# Patient Record
Sex: FEMALE | Race: WHITE | NOT HISPANIC OR LATINO | Employment: OTHER | URBAN - METROPOLITAN AREA
[De-identification: names, ages, dates, MRNs, and addresses within clinical notes are randomized per-mention and may not be internally consistent; named-entity substitution may affect disease eponyms.]

---

## 2017-01-01 ENCOUNTER — APPOINTMENT (OUTPATIENT)
Dept: GENERAL RADIOLOGY | Facility: HOSPITAL | Age: 82
End: 2017-01-01

## 2017-01-01 ENCOUNTER — HOSPITAL ENCOUNTER (EMERGENCY)
Facility: HOSPITAL | Age: 82
Discharge: HOME | End: 2017-01-01
Attending: EMERGENCY MEDICINE | Admitting: EMERGENCY MEDICINE

## 2017-01-01 VITALS
BODY MASS INDEX: 22.38 KG/M2 | SYSTOLIC BLOOD PRESSURE: 165 MMHG | DIASTOLIC BLOOD PRESSURE: 96 MMHG | HEIGHT: 60 IN | RESPIRATION RATE: 16 BRPM | WEIGHT: 114 LBS | OXYGEN SATURATION: 100 % | TEMPERATURE: 98.1 F | HEART RATE: 83 BPM

## 2017-01-01 DIAGNOSIS — J20.9 ACUTE BRONCHITIS, UNSPECIFIED ORGANISM: Primary | ICD-10-CM

## 2017-01-01 DIAGNOSIS — I50.9 CONGESTIVE HEART FAILURE, UNSPECIFIED CONGESTIVE HEART FAILURE CHRONICITY, UNSPECIFIED CONGESTIVE HEART FAILURE TYPE: ICD-10-CM

## 2017-01-01 DIAGNOSIS — J98.01 BRONCHOSPASM: ICD-10-CM

## 2017-01-01 LAB
ALBUMIN SERPL-MCNC: 3.5 G/DL (ref 3.5–5.2)
ALBUMIN/GLOB SERPL: 1.1 G/DL
ALP SERPL-CCNC: 57 U/L (ref 40–129)
ALT SERPL W P-5'-P-CCNC: 6 U/L (ref 5–33)
ANION GAP SERPL CALCULATED.3IONS-SCNC: 16 MMOL/L
AST SERPL-CCNC: 16 U/L (ref 5–32)
BASOPHILS # BLD AUTO: 0.02 10*3/MM3 (ref 0–0.2)
BASOPHILS NFR BLD AUTO: 0.5 % (ref 0–2)
BILIRUB SERPL-MCNC: 0.2 MG/DL (ref 0.2–1.2)
BUN BLD-MCNC: 16 MG/DL (ref 8–23)
BUN/CREAT SERPL: 16 (ref 7–25)
CALCIUM SPEC-SCNC: 9.3 MG/DL (ref 8.2–9.6)
CHLORIDE SERPL-SCNC: 85 MMOL/L (ref 98–107)
CO2 SERPL-SCNC: 21 MMOL/L (ref 22–29)
CREAT BLD-MCNC: 1 MG/DL (ref 0.57–1)
DEPRECATED RDW RBC AUTO: 42.8 FL (ref 37–54)
EOSINOPHIL # BLD AUTO: 0.12 10*3/MM3 (ref 0.1–0.3)
EOSINOPHIL NFR BLD AUTO: 3.2 % (ref 0–4)
ERYTHROCYTE [DISTWIDTH] IN BLOOD BY AUTOMATED COUNT: 13.6 % (ref 11.5–14.5)
FLUAV AG NPH QL: NEGATIVE
FLUBV AG NPH QL IA: NEGATIVE
GFR SERPL CREATININE-BSD FRML MDRD: 52 ML/MIN/1.73
GLOBULIN UR ELPH-MCNC: 3.2 GM/DL
GLUCOSE BLD-MCNC: 102 MG/DL (ref 65–99)
HCT VFR BLD AUTO: 31.5 % (ref 37–47)
HGB BLD-MCNC: 10.2 G/DL (ref 12–16)
IMM GRANULOCYTES # BLD: 0.02 10*3/MM3 (ref 0–0.03)
IMM GRANULOCYTES NFR BLD: 0.5 % (ref 0–0.5)
LYMPHOCYTES # BLD AUTO: 0.74 10*3/MM3 (ref 0.6–4.8)
LYMPHOCYTES NFR BLD AUTO: 19.8 % (ref 20–45)
MCH RBC QN AUTO: 28 PG (ref 27–31)
MCHC RBC AUTO-ENTMCNC: 32.4 G/DL (ref 31–37)
MCV RBC AUTO: 86.5 FL (ref 81–99)
MONOCYTES # BLD AUTO: 0.5 10*3/MM3 (ref 0–1)
MONOCYTES NFR BLD AUTO: 13.4 % (ref 3–8)
NEUTROPHILS # BLD AUTO: 2.34 10*3/MM3 (ref 1.5–8.3)
NEUTROPHILS NFR BLD AUTO: 62.6 % (ref 45–70)
NRBC BLD MANUAL-RTO: 0 /100 WBC (ref 0–0)
NT-PROBNP SERPL-MCNC: 2141 PG/ML (ref 5–450)
PLATELET # BLD AUTO: 188 10*3/MM3 (ref 140–500)
PMV BLD AUTO: 10.5 FL (ref 7.4–10.4)
POTASSIUM BLD-SCNC: 3.9 MMOL/L (ref 3.5–5.2)
PROT SERPL-MCNC: 6.7 G/DL (ref 6–8.5)
RBC # BLD AUTO: 3.64 10*6/MM3 (ref 4.2–5.4)
SODIUM BLD-SCNC: 122 MMOL/L (ref 136–145)
TROPONIN T SERPL-MCNC: <0.01 NG/ML (ref 0–0.03)
WBC NRBC COR # BLD: 3.74 10*3/MM3 (ref 4.8–10.8)

## 2017-01-01 PROCEDURE — 84484 ASSAY OF TROPONIN QUANT: CPT | Performed by: EMERGENCY MEDICINE

## 2017-01-01 PROCEDURE — 99284 EMERGENCY DEPT VISIT MOD MDM: CPT | Performed by: EMERGENCY MEDICINE

## 2017-01-01 PROCEDURE — 99283 EMERGENCY DEPT VISIT LOW MDM: CPT

## 2017-01-01 PROCEDURE — 71020 HC CHEST PA AND LATERAL: CPT

## 2017-01-01 PROCEDURE — 80053 COMPREHEN METABOLIC PANEL: CPT | Performed by: EMERGENCY MEDICINE

## 2017-01-01 PROCEDURE — 94640 AIRWAY INHALATION TREATMENT: CPT

## 2017-01-01 PROCEDURE — 87804 INFLUENZA ASSAY W/OPTIC: CPT | Performed by: EMERGENCY MEDICINE

## 2017-01-01 PROCEDURE — 85025 COMPLETE CBC W/AUTO DIFF WBC: CPT | Performed by: EMERGENCY MEDICINE

## 2017-01-01 PROCEDURE — 93010 ELECTROCARDIOGRAM REPORT: CPT | Performed by: INTERNAL MEDICINE

## 2017-01-01 PROCEDURE — 83880 ASSAY OF NATRIURETIC PEPTIDE: CPT | Performed by: EMERGENCY MEDICINE

## 2017-01-01 PROCEDURE — 93005 ELECTROCARDIOGRAM TRACING: CPT | Performed by: EMERGENCY MEDICINE

## 2017-01-01 RX ORDER — IPRATROPIUM BROMIDE AND ALBUTEROL SULFATE 2.5; .5 MG/3ML; MG/3ML
3 SOLUTION RESPIRATORY (INHALATION) ONCE
Status: COMPLETED | OUTPATIENT
Start: 2017-01-01 | End: 2017-01-01

## 2017-01-01 RX ORDER — IPRATROPIUM BROMIDE AND ALBUTEROL SULFATE 2.5; .5 MG/3ML; MG/3ML
SOLUTION RESPIRATORY (INHALATION)
Status: DISCONTINUED
Start: 2017-01-01 | End: 2017-01-01 | Stop reason: HOSPADM

## 2017-01-01 RX ORDER — ALBUTEROL SULFATE 90 UG/1
2 AEROSOL, METERED RESPIRATORY (INHALATION)
Qty: 8 G | Refills: 0 | Status: SHIPPED | OUTPATIENT
Start: 2017-01-01

## 2017-01-01 RX ORDER — ONDANSETRON 8 MG/1
8 TABLET, ORALLY DISINTEGRATING ORAL EVERY 8 HOURS PRN
Qty: 10 TABLET | Refills: 0 | Status: SHIPPED | OUTPATIENT
Start: 2017-01-01

## 2017-01-01 RX ADMIN — IPRATROPIUM BROMIDE AND ALBUTEROL SULFATE 3 ML: .5; 3 SOLUTION RESPIRATORY (INHALATION) at 14:14

## 2017-01-01 RX ADMIN — IPRATROPIUM BROMIDE AND ALBUTEROL SULFATE 3 ML: .5; 3 SOLUTION RESPIRATORY (INHALATION) at 13:28

## 2017-01-01 RX ADMIN — IPRATROPIUM BROMIDE AND ALBUTEROL SULFATE 3 ML: .5; 3 SOLUTION RESPIRATORY (INHALATION) at 12:19

## 2017-01-01 NOTE — DISCHARGE INSTRUCTIONS
Take your Lasix 20 mg by mouth daily today and tomorrow.  Call Dr. Alvarenga for a follow-up on Tuesday.  Call your cardiologist for a follow-up in 1 week.  Return if you have increasing shortness of breath, vomiting, worse in any way at all.

## 2017-01-01 NOTE — ED PROVIDER NOTES
Subjective   History of Present Illness  History of Present Illness    Chief complaint: Cough    Location: Home    Quality/Severity:  Moderate with vomiting    Timing/Onset/Duration: Gradual onset over the last 2 days    Modifying Factors: Coughing seems to make it worse, remaining still makes it better    Associated Symptoms: No headache.  The patient has had a subjective fever.  The patient has had a cough productive of a little sputum, the patient is unsure the color.  There is been no sore throat earache or nasal congestion.  There is no chest pain.  There is no shortness of breath.  There is no abdominal pain, diarrhea, or burning when she urinates.  Patient has had chills.    Narrative: This 89-year-old white female presents with a cough with vomiting secondary to proximal systems of cough.  This is been going on for 2 days.  The patient was seen at an [facility] center on [facility] on Thursday and prescribe doxycycline.  X-rays according to the family showed questionable scar tissue and the patient was placed on the doxycycline to cover her for possible pneumonia.  The patient has had chills.  And subjective fever.  The cough is been slightly productive.  The patient has never been a smoker.  Patient has no history of COPD or asthma.  The patient does have a history of hypertension and states that her blood pressure goes up when she is and the doctor's office.    PCP:  Jada Alvarenga      Review of Systems   Constitutional: Positive for chills and fever (subjective).   HENT: Negative for congestion, ear pain and sore throat.    Eyes: Negative for pain and discharge.   Respiratory: Positive for cough. Negative for chest tightness, shortness of breath and wheezing.    Cardiovascular: Negative for chest pain, palpitations and leg swelling.   Gastrointestinal: Positive for vomiting (secondary to  cough). Negative for abdominal pain, blood in stool, constipation, diarrhea and nausea.   Genitourinary: Negative  for dysuria, flank pain and frequency.   Musculoskeletal: Negative for back pain.   Skin: Negative for rash.   Neurological: Negative for weakness and headaches.   Hematological: Negative for adenopathy.   Psychiatric/Behavioral: Negative for confusion.        Medication List      ASK your doctor about these medications          amLODIPine 5 MG tablet   Commonly known as:  NORVASC       aspirin 81 MG EC tablet       doxycycline 100 MG capsule   Commonly known as:  MONODOX   Take 1 capsule by mouth 2 (Two) Times a Day.       furosemide 20 MG tablet   Commonly known as:  LASIX       losartan 50 MG tablet   Commonly known as:  COZAAR       metoprolol succinate XL 25 MG 24 hr tablet   Commonly known as:  TOPROL-XL       NAMENDA 5 MG tablet   Generic drug:  memantine       omeprazole 40 MG capsule   Commonly known as:  priLOSEC       PHENERGAN PO       SYNTHROID 50 MCG tablet   Generic drug:  levothyroxine       ZETIA 10 MG tablet   Generic drug:  ezetimibe           Past Medical History   Diagnosis Date   • Coronary artery disease    • Dementia    • Disease of thyroid gland    • GERD (gastroesophageal reflux disease)    • Hyperlipidemia    • Hypertension        Allergies   Allergen Reactions   • Motrin [Ibuprofen]    • Penicillins    • Zithromax [Azithromycin] Swelling       Past Surgical History   Procedure Laterality Date   • Cardiac surgery     • Hysterectomy     • Cholecystectomy         History reviewed. No pertinent family history.    Social History     Social History   • Marital status:      Spouse name: N/A   • Number of children: N/A   • Years of education: N/A     Social History Main Topics   • Smoking status: Former Smoker   • Smokeless tobacco: None   • Alcohol use No   • Drug use: Defer   • Sexual activity: Defer     Other Topics Concern   • None     Social History Narrative   • None           Objective   Physical Exam   Constitutional: She is oriented to person, place, and time. She appears  well-developed and well-nourished. No distress.   ED Triage Vitals:  Temp: 98.1 °F (36.7 °C) (01/01/17 1127)  Heart Rate: 79 (01/01/17 1127)  Resp: 16 (01/01/17 1127)  BP: 165/96 (01/01/17 1127)  SpO2: 96 % (01/01/17 1127)  Temp src: Oral (01/01/17 1127)  Heart Rate Source: Monitor (01/01/17 1127)  Patient Position: Sitting (01/01/17 1127)  BP Location: Right arm (01/01/17 1127)  FiO2 (%): n/a    The patient's vitals were reviewed by me.  Unless otherwise noted they are within normal limits.  The patient is hypertensive with a blood pressure 165/96.     HENT:   Head: Normocephalic and atraumatic.   Right Ear: External ear normal.   Left Ear: External ear normal.   Nose: Nose normal.   Mouth/Throat: Oropharynx is clear and moist.   Eyes: Conjunctivae and EOM are normal. Pupils are equal, round, and reactive to light. Right eye exhibits no discharge. Left eye exhibits no discharge. No scleral icterus.   Neck: Normal range of motion. Neck supple. No JVD present. No tracheal deviation present. No thyromegaly present.   Cardiovascular: Normal rate, regular rhythm, normal heart sounds and intact distal pulses.  Exam reveals no gallop and no friction rub.    No murmur heard.  Pulmonary/Chest: Effort normal. No stridor. No respiratory distress. She has wheezes (the patient has expiratory wheezes on the left upper and lower lung fields). She has no rales. She exhibits no tenderness.   Abdominal: Soft. Bowel sounds are normal. She exhibits no distension and no mass. There is no tenderness. There is no rebound and no guarding. No hernia.   Musculoskeletal: Normal range of motion. She exhibits no edema or deformity.   Lymphadenopathy:     She has no cervical adenopathy.   Neurological: She is alert and oriented to person, place, and time.   Skin: Skin is warm and dry. No rash noted. She is not diaphoretic. No erythema. No pallor.   Psychiatric: Her behavior is normal.   Nursing note and vitals reviewed.      Procedures          ED Course  ED Course   Comment By Time   The laboratory values were reviewed by me.  The BNP is 2141.  The white blood cell count is 3.7.  The hemoglobin is 10.  The hematocrit is 31.5.  The glucose is 102.  The sodium is 1.2.  The chloride is 85.  The CO2 is 21.  The laboratory values are otherwise unremarkable. Vinay Joe MD 01/01 1323                  MDM  XR Chest 2 View   ED Interpretation   The chest x-ray was contemporaneously reviewed by me.  There is   cardiomegaly and mild pulmonary vascular congestion.        Labs Reviewed   COMPREHENSIVE METABOLIC PANEL - Abnormal; Notable for the following:        Result Value    Glucose 102 (*)     Sodium 122 (*)     Chloride 85 (*)     CO2 21.0 (*)     eGFR Non  Amer 52 (*)     All other components within normal limits    Narrative:     The MDRD GFR formula is only valid for adults with stable renal function between ages 18 and 70.   BNP (IN-HOUSE) - Abnormal; Notable for the following:     proBNP 2141.0 (*)     All other components within normal limits    Narrative:     Among patients with dyspnea, NT-proBNP is highly sensitive for the detection of acute congestive heart failure. In addition NT-proBNP of <300 pg/ml effectively rules out acute congestive heart failure with 99% negative predictive value.   CBC WITH AUTO DIFFERENTIAL - Abnormal; Notable for the following:     WBC 3.74 (*)     RBC 3.64 (*)     Hemoglobin 10.2 (*)     Hematocrit 31.5 (*)     MPV 10.5 (*)     Lymphocyte % 19.8 (*)     Monocyte % 13.4 (*)     All other components within normal limits   INFLUENZA ANTIGEN - Normal   TROPONIN (IN-HOUSE) - Normal    Narrative:     Troponin T Reference Ranges:  Less than 0.03 ng/mL:    Negative for AMI  0.03 to 0.09 ng/mL:      Indeterminant for AMI  Greater than 0.09 ng/mL: Positive for AMI   CBC AND DIFFERENTIAL    Narrative:     The following orders were created for panel order CBC & Differential.  Procedure                                Abnormality         Status                     ---------                               -----------         ------                     CBC Auto Differential[[id number]]         Abnormal            Final result                 Please view results for these tests on the individual orders.     4:25 PM, 01/01/17:  The EKG was performed at 1208.  The EKG was interpreted by me at 1210.  EKG indicates normal sinus rhythm with rate of 70.  There is no ectopy.  There is a normal axis without hypertrophy.  The ID, QRS, and QT intervals are normal.  There is no ST elevation or depression.  There is poor anterior R-wave progression.    4:25 PM, 01/01/17:  The patient was reassessed.  Her vital signs are stable.  Lungs: Clear to auscultation with minimal expiratory wheeze on the right.    4:30 PM, 01/01/17:  It was discussed with the patient and the family that she has bronchitis with bronchospasm.  She has an element of mild fluid overload and we will have her take a dose of her Lasix today and tomorrow.  Will be given a prescription for a bronchodilator, something for her cough, and patient is to follow-up with Dr. Alvarenga in 2 days and her cardiologist within 1 week.  The patient is to return if there is increasing shortness of breath, chest pain, vomiting, worse in anyway at all.  All the patient's questions were answered patient will be discharged in improved condition.      Final diagnoses:   None         ED Medications:  Medications   ipratropium-albuterol (DUO-NEB) 0.5-2.5 mg/mL nebulizer solution  - ADS Override Pull (not administered)   ipratropium-albuterol (DUO-NEB) nebulizer solution 3 mL (3 mL Nebulization Given 1/1/17 1219)   ipratropium-albuterol (DUO-NEB) nebulizer solution 3 mL (3 mL Nebulization Given 1/1/17 1328)   ipratropium-albuterol (DUO-NEB) nebulizer solution 3 mL (3 mL Nebulization Given 1/1/17 1414)       New Medications:     Medication List      ASK your doctor about these medications           amLODIPine 5 MG tablet   Commonly known as:  NORVASC       aspirin 81 MG EC tablet       doxycycline 100 MG capsule   Commonly known as:  MONODOX   Take 1 capsule by mouth 2 (Two) Times a Day.       furosemide 20 MG tablet   Commonly known as:  LASIX       losartan 50 MG tablet   Commonly known as:  COZAAR       metoprolol succinate XL 25 MG 24 hr tablet   Commonly known as:  TOPROL-XL       NAMENDA 5 MG tablet   Generic drug:  memantine       omeprazole 40 MG capsule   Commonly known as:  priLOSEC       PHENERGAN PO       SYNTHROID 50 MCG tablet   Generic drug:  levothyroxine       ZETIA 10 MG tablet   Generic drug:  ezetimibe           Stopped Medications:     Medication List      ASK your doctor about these medications          amLODIPine 5 MG tablet   Commonly known as:  NORVASC       aspirin 81 MG EC tablet       doxycycline 100 MG capsule   Commonly known as:  MONODOX   Take 1 capsule by mouth 2 (Two) Times a Day.       furosemide 20 MG tablet   Commonly known as:  LASIX       losartan 50 MG tablet   Commonly known as:  COZAAR       metoprolol succinate XL 25 MG 24 hr tablet   Commonly known as:  TOPROL-XL       NAMENDA 5 MG tablet   Generic drug:  memantine       omeprazole 40 MG capsule   Commonly known as:  priLOSEC       PHENERGAN PO       SYNTHROID 50 MCG tablet   Generic drug:  levothyroxine       ZETIA 10 MG tablet   Generic drug:  ezetimibe             Final diagnoses:   Acute bronchitis, unspecified organism   Bronchospasm   Congestive heart failure, unspecified congestive heart failure chronicity, unspecified congestive heart failure type            Vinay Joe MD  01/01/17 [id number]

## 2017-06-03 ENCOUNTER — APPOINTMENT (OUTPATIENT)
Dept: GENERAL RADIOLOGY | Facility: HOSPITAL | Age: 82
End: 2017-06-03

## 2017-06-03 ENCOUNTER — HOSPITAL ENCOUNTER (EMERGENCY)
Facility: HOSPITAL | Age: 82
Discharge: HOME | End: 2017-06-03
Attending: EMERGENCY MEDICINE | Admitting: EMERGENCY MEDICINE

## 2017-06-03 VITALS
HEIGHT: 61 IN | RESPIRATION RATE: 18 BRPM | BODY MASS INDEX: 21.9 KG/M2 | WEIGHT: 116 LBS | OXYGEN SATURATION: 98 % | SYSTOLIC BLOOD PRESSURE: 131 MMHG | TEMPERATURE: 98.1 F | HEART RATE: 70 BPM | DIASTOLIC BLOOD PRESSURE: 59 MMHG

## 2017-06-03 DIAGNOSIS — S80.01XA CONTUSION OF RIGHT KNEE, INITIAL ENCOUNTER: Primary | ICD-10-CM

## 2017-06-03 DIAGNOSIS — S40.012A CONTUSION OF LEFT SHOULDER, INITIAL ENCOUNTER: ICD-10-CM

## 2017-06-03 PROCEDURE — 99282 EMERGENCY DEPT VISIT SF MDM: CPT | Performed by: EMERGENCY MEDICINE

## 2017-06-03 PROCEDURE — 73562 X-RAY EXAM OF KNEE 3: CPT

## 2017-06-03 PROCEDURE — 99283 EMERGENCY DEPT VISIT LOW MDM: CPT

## 2017-06-03 NOTE — ED PROVIDER NOTES
Subjective     History provided by:  Patient (Son)    History of Present Illness    · Chief complaint: Fall    · Location: Injury to right knee and left shoulder    · Quality/Severity: Very mild discomfort in left shoulder.  Mild to moderate discomfort in the right knee.  Patient is able to walk on her right lower extremity.    · Timing/Onset: The patient lost her balance and fell in the grass of her yard yesterday while watering flowers.    · Modifying Factors: Walking exacerbates right knee discomfort.    · Associated symptoms: No other injuries.  She denies hitting her head or loss of consciousness.    · Narrative: The patient is a 89-year-old white female that lost her bowels and fell in the grass of her yard yesterday.  She reports some persistent discomfort in her right knee as well as some mild discomfort in her left shoulder.    ED Triage Vitals   Temp Heart Rate Resp BP SpO2   06/03/17 1421 06/03/17 1421 06/03/17 1421 06/03/17 1421 06/03/17 1421   98.1 °F (36.7 °C) 70 18 131/59 98 %      Temp src Heart Rate Source Patient Position BP Location FiO2 (%)   -- -- 06/03/17 1421 06/03/17 1421 --     Sitting Right arm        Review of Systems   Constitutional: Negative for activity change, appetite change, chills, diaphoresis, fatigue and fever.   HENT: Negative for congestion, dental problem, ear pain, hearing loss, mouth sores, postnasal drip, rhinorrhea, sinus pressure, sore throat and voice change.    Eyes: Negative for photophobia, pain, discharge, redness and visual disturbance.   Respiratory: Negative for cough, chest tightness, shortness of breath, wheezing and stridor.    Cardiovascular: Negative for chest pain, palpitations and leg swelling.   Gastrointestinal: Negative for abdominal pain, diarrhea, nausea and vomiting.   Genitourinary: Negative for difficulty urinating, dysuria, flank pain, frequency, hematuria and urgency.   Musculoskeletal: Negative for arthralgias, back pain, gait problem, joint  swelling, myalgias, neck pain and neck stiffness.   Skin: Negative for color change and rash.   Neurological: Negative for dizziness, tremors, seizures, syncope, facial asymmetry, speech difficulty, weakness, light-headedness, numbness and headaches.   Hematological: Negative for adenopathy.   Psychiatric/Behavioral: Negative.  Negative for confusion and decreased concentration. The patient is not nervous/anxious.        Past Medical History:   Diagnosis Date   • Coronary artery disease    • Dementia    • Disease of thyroid gland    • GERD (gastroesophageal reflux disease)    • Hyperlipidemia    • Hypertension        Allergies   Allergen Reactions   • Motrin [Ibuprofen]    • Penicillins    • Zithromax [Azithromycin] Swelling       Past Surgical History:   Procedure Laterality Date   • CARDIAC SURGERY     • CHOLECYSTECTOMY     • HYSTERECTOMY         History reviewed. No pertinent family history.    Social History     Social History   • Marital status:      Spouse name: N/A   • Number of children: N/A   • Years of education: N/A     Social History Main Topics   • Smoking status: Former Smoker   • Smokeless tobacco: None   • Alcohol use No   • Drug use: Defer   • Sexual activity: Defer     Other Topics Concern   • None     Social History Narrative           Objective   Physical Exam   Constitutional: She is oriented to person, place, and time. She appears well-developed and well-nourished. No distress.   HENT:   Head: Normocephalic and atraumatic.   Nose: Nose normal.   Mouth/Throat: Oropharynx is clear and moist. No oropharyngeal exudate.   Eyes: EOM are normal. Pupils are equal, round, and reactive to light. Right eye exhibits no discharge. Left eye exhibits no discharge. No scleral icterus.   Neck: Normal range of motion. Neck supple. No JVD present. No thyromegaly present.   Cardiovascular: Normal rate, regular rhythm and normal heart sounds.    No murmur heard.  Pulmonary/Chest: Effort normal and breath  sounds normal. She has no wheezes. She has no rales. She exhibits no tenderness.   Abdominal: Soft. Bowel sounds are normal. She exhibits no distension. There is no tenderness.   Musculoskeletal: Normal range of motion. She exhibits no edema, tenderness or deformity.   Left shoulder has no tenderness or deformity and has full range of motion without pain.  The left knee is nontender without swelling or deformity.  There is no ligamental laxity.  She is full range of motion without significant discomfort.  The right leg is nontender no deformity in her right foot is neurovascularly intact without tenderness.   Lymphadenopathy:     She has no cervical adenopathy.   Neurological: She is alert and oriented to person, place, and time. No cranial nerve deficit. Coordination normal.   No focal motor sensory deficit   Skin: Skin is warm and dry. No rash noted. She is not diaphoretic.   Psychiatric: She has a normal mood and affect. Her behavior is normal. Judgment and thought content normal.   Nursing note and vitals reviewed.      Procedures         ED Course  ED Course   Comment By Time   [facility] Report [id number] Luc Wilhelm MD 06/03 1518                  [facility]  Number of Diagnoses or Management Options  Contusion of left shoulder, initial encounter: new and does not require workup  Contusion of right knee, initial encounter: new and requires workup     Amount and/or Complexity of Data Reviewed  Tests in the radiology section of CPT®: ordered and reviewed  Independent visualization of images, tracings, or specimens: yes    Risk of Complications, Morbidity, and/or Mortality  Presenting problems: moderate  Diagnostic procedures: moderate  Management options: low    Patient Progress  Patient progress: stable      Final diagnoses:   Contusion of right knee, initial encounter   Contusion of left shoulder, initial encounter           Labs Reviewed - No data to display  XR Knee 3 View Right   ED Interpretation   No fracture or  bony deformity.  Calcified vein seen             Medication List      Stop          doxycycline 100 MG capsule   Commonly known as:  MONODOX       HYDROcodone-homatropine 5-1.5 MG/5ML syrup   Commonly known as:  HYCODAN       ondansetron ODT 8 MG disintegrating tablet   Commonly known as:  ZOFRAN ODT       PHENERGAN PO                Luc Wilhelm MD  06/03/17 2389